# Patient Record
(demographics unavailable — no encounter records)

---

## 2017-08-27 NOTE — EMERGENCY DEPARTMENT REPORT
- General


Chief complaint: Skin/Abscess/Foreign Body


Stated complaint: PAINFUL KNOT ON BACK/CONSTANT ITCHING


Time Seen by Provider: 08/27/17 20:55


Source: patient


Mode of arrival: Ambulatory


Limitations: No Limitations





- History of Present Illness


Initial comments: 





This is a 27-year-old female nontoxic, well nourished in appearance, no acute 

signs of distress deficit ED complaining of open abscess to the  back region.  

Patient states she developed the symptoms last week and stated it was draining 

with purulent drainage and stated it is getting worse.  Patient denies any 

trauma.  Denies fever, chills, nausea, vomiting, chest pain, shortness of breath

, abdominal pain, numbness, tingling, stiff neck, or headaches.  Patient denies 

any allergies or past medical history.


MD complaint: abscess/boil


-: Gradual, week(s) (1)


Tetanus Up to Date: unsure


Location: back


Severity: moderate


Severity scale (0 -10): 8


Quality: burning, aching


Consistency: constant


Improves with: none


Worsens with: none


Context: none


Associated symptoms: denies other symptoms


Treatments Prior to Arrival: none





- Related Data


 Previous Rx's











 Medication  Instructions  Recorded  Last Taken  Type


 


Clindamycin [Clindamycin CAP] 450 mg PO Q8HR 7 Days 08/27/17 Unknown Rx


 


Ibuprofen [Motrin 600 MG tab] 600 mg PO Q8H PRN #30 tablet 08/27/17 Unknown Rx











 Allergies











Allergy/AdvReac Type Severity Reaction Status Date / Time


 


No Known Allergies Allergy   Unverified 08/27/17 19:29














Abscess Boil HPI





- HPI


Chief Complaint: Skin/Abscess/Foreign Body


Stated Complaint: PAINFUL KNOT ON BACK/CONSTANT ITCHING


Time Seen by Provider: 08/27/17 20:55


Home Medications: 


 Previous Rx's











 Medication  Instructions  Recorded  Last Taken  Type


 


Clindamycin [Clindamycin CAP] 450 mg PO Q8HR 7 Days 08/27/17 Unknown Rx


 


Ibuprofen [Motrin 600 MG tab] 600 mg PO Q8H PRN #30 tablet 08/27/17 Unknown Rx











Allergies/Adverse Reactions: 


 Allergies











Allergy/AdvReac Type Severity Reaction Status Date / Time


 


No Known Allergies Allergy   Unverified 08/27/17 19:29














ED Review of Systems


ROS: 


Stated complaint: PAINFUL KNOT ON BACK/CONSTANT ITCHING


Other details as noted in HPI





Constitutional: denies: chills, fever


Eyes: denies: eye pain, eye discharge, vision change


ENT: denies: ear pain, throat pain


Respiratory: denies: cough, shortness of breath, wheezing


Cardiovascular: denies: chest pain, palpitations


Endocrine: no symptoms reported


Gastrointestinal: denies: abdominal pain, nausea, diarrhea


Genitourinary: denies: urgency, dysuria, discharge


Musculoskeletal: denies: back pain, joint swelling, arthralgia


Skin: denies: rash, lesions


Neurological: denies: headache, weakness, paresthesias


Psychiatric: denies: anxiety, depression


Hematological/Lymphatic: denies: easy bleeding, easy bruising





ED Past Medical Hx





- Past Medical History


Previous Medical History?: No





- Surgical History


Past Surgical History?: No





- Social History


Smoking Status: Never Smoker


Substance Use Type: None





- Medications


Home Medications: 


 Home Medications











 Medication  Instructions  Recorded  Confirmed  Last Taken  Type


 


Clindamycin [Clindamycin CAP] 450 mg PO Q8HR 7 Days 08/27/17  Unknown Rx


 


Ibuprofen [Motrin 600 MG tab] 600 mg PO Q8H PRN #30 tablet 08/27/17  Unknown Rx














ED Physical Exam





- General


Limitations: No Limitations


General appearance: alert, in no apparent distress





- Head


Head exam: Present: atraumatic, normocephalic





- Eye


Eye exam: Present: normal appearance, PERRL, EOMI.  Absent: scleral icterus, 

conjunctival injection, nystagmus, periorbital swelling, periorbital tenderness


Pupils: Present: normal accommodation





- ENT


ENT exam: Present: normal exam, normal orophraynx, mucous membranes moist, TM's 

normal bilaterally, normal external ear exam





- Neck


Neck exam: Present: normal inspection, full ROM.  Absent: tenderness, 

meningismus, lymphadenopathy, thyromegaly





- Respiratory


Respiratory exam: Present: normal lung sounds bilaterally.  Absent: respiratory 

distress, wheezes, rales, rhonchi, stridor, chest wall tenderness, accessory 

muscle use, decreased breath sounds, prolonged expiratory





- Cardiovascular


Cardiovascular Exam: Present: regular rate, normal rhythm, normal heart sounds.

  Absent: bradycardia, tachycardia, irregular rhythm, systolic murmur, 

diastolic murmur, rubs, gallop





- GI/Abdominal


GI/Abdominal exam: Present: soft, normal bowel sounds.  Absent: distended, 

tenderness, guarding, rebound, rigid, diminished bowel sounds





- Rectal


Rectal exam: Present: deferred





- Extremities Exam


Extremities exam: Present: normal inspection, full ROM, normal capillary 

refill.  Absent: tenderness, pedal edema, joint swelling, calf tenderness





- Back Exam


Back exam: Present: normal inspection, full ROM, other (2 cm x 2 cm open 

abscess with purulent drainage.  No induration.  No fluctuance.  Tender to 

touch.  Warm to touch.  Erythema.).  Absent: tenderness, CVA tenderness (R), 

CVA tenderness (L), muscle spasm, paraspinal tenderness, vertebral tenderness, 

rash noted





- Neurological Exam


Neurological exam: Present: alert, oriented X3, CN II-XII intact, normal gait, 

reflexes normal





- Psychiatric


Psychiatric exam: Present: normal affect, normal mood





- Skin


Skin exam: Present: warm, dry, intact, normal color.  Absent: rash





ED Course


 Vital Signs











  08/27/17





  19:26


 


Temperature 99.3 F


 


Pulse Rate 64


 


Respiratory 18





Rate 


 


Blood Pressure 154/87


 


O2 Sat by Pulse 100





Oximetry 














- Reevaluation(s)


Reevaluation #1: 





08/27/17 22:02


Patient speaking in full sentences but no signs of distress.





ED Medical Decision Making





- Medical Decision Making





This is a 27-year-old female that presents with cellulitis and open abscess 

that is draining purulent.  Patient was examined myself.  Patient is stable.  

Patient be treated with clindamycin at discharge.  A permanent marker has been 

used to outline the redness.  Patient was instructed to follow-up with a 

primary Up in 3-5 days or symptoms such as increased swelling, increased 

redness past permanent marker, or worsening symptoms return to emergency room 

as soon as possible. At time time of discharge, the patient does not seem toxic 

or ill in appearance.  No acute signs of distress noted.  Patient agrees to 

discharge treatment plan of care.  No further questions noted by the patient.


Critical care attestation.: 


If time is entered above; I have spent that time in minutes in the direct care 

of this critically ill patient, excluding procedure time.








ED Disposition


Clinical Impression: 


Cellulitis


Qualifiers:


 Site of cellulitis: other site Qualified Code(s): L03.818 - Cellulitis of 

other sites





Disposition: DC-01 TO HOME OR SELFCARE


Is pt being admited?: No


Does the pt Need Aspirin: No


Condition: Stable


Instructions:  Clindamycin (By mouth), Ibuprofen (By mouth), Cellulitis (ED)


Additional Instructions: 


Follow-up with a primary Up in 3-5 days or symptoms such as increased swelling, 

increased redness past permanent marker, or worsening symptoms return to 

emergency room as soon as possible. 


Take the full course of antibiotic that was prescribed.


Take ibuprofen as prescribed as needed for pain.


Prescriptions: 


Clindamycin [Clindamycin CAP] 450 mg PO Q8HR 7 Days


Ibuprofen [Motrin 600 MG tab] 600 mg PO Q8H PRN #30 tablet


 PRN Reason: Pain


Referrals: 


PRIMARY CARE,MD [Primary Care Provider] - 3-5 Days


CAROLINA GEIGER MD [Staff Physician] - 3-5 Days


[facility] [Outside] - 3-5 Days


[facility] [Outside] - 3-5 Days


Forms:  Work/School Release Form(ED)

## 2018-01-13 NOTE — EMERGENCY DEPARTMENT REPORT
Minor Respiratory (Peds)





- HPI


Chief Complaint: Sore Throat


Stated Complaint: SORE THROAT


Time Seen by Provider: 01/13/18 20:02


Duration: 2 Days


Pain Location: Throat


Pain Severity: Moderate


Symptoms: Yes Fever, Yes Sore Throat, Yes Sick Contacts, Yes Able to Tolerate 

Fluids, Yes Good Urine Output, Yes Active and Alert, No Rhinorrhea, No Ear Pain

, No Cough, No Shortness of Breath





ED Review of Systems


ROS: 


Stated complaint: TONGUE AND THROAT SWOLLEN


Other details as noted in HPI





Comment: All other systems reviewed and negative


ENT: throat pain.  denies: ear pain, dental pain, hearing loss, epistaxis, 

congestion





Pediatric Past Medical History





- Chronic Health Problems


Hx Asthma: No


Hx Diabetes: No


Hx HIV: No


Hx Renal Disease: No


Hx Sickle Cell Disease: No


Hx Seizures: No





Peds Minor Resp. exam





- Exam


General: 


Vital signs noted. No distress. Alert and acting appropriately.





Peds HEENT: Pharyngeal Erythema: Yes, Pharyngeal Exudates: No, Moist Mucous 

Membranes: Yes, Rhinorrhea: No, Conjuctival Injection: No


Ear: Neither TM Bulge, Neither TM Erythema, Neither EAC Discharge


Peds neck exam: Adenopathy: No, Supple: Yes


Peds Lung exam: Good Air Exchange: Yes, Wheezes: No, Stridor: No, Cough: No, 

Nasal Flaring: No, Retractions: No, Use of Accessory Muscles: No


Heart: Yes Regular, No Murmur


Peds abdomen: Abdominal Tenderness: No, Peritoneal Signs: No, Normal Bowel 

Sounds: Yes, Distention: No


Peds Skin Exam: Rash: No, Eczema: No


Neurologic: 


Alert and oriented, no deficits.








Musculoskeletal: 


Unremarkable.











ED Course


 Vital Signs











  01/13/18





  17:36


 


Temperature 100.3 F H


 


Pulse Rate 110 H


 


Respiratory 18





Rate 


 


Blood Pressure 138/96


 


O2 Sat by Pulse 100





Oximetry 














- Reevaluation(s)


Reevaluation #1: 





01/13/18 21:36


Patient to the emergency room today with chief complaint of shortness of breath 

or fever.  She states that she has been like this for 2 days.  She also brings 

with her 10-year-old child sustained.  Patient has been taking over-the-counter 

Motrin and Tylenol.  Patient states it is difficult for her to talk because of 

emesis.  Erythema of the posterior nasopharynx.  No abscess.  Fever noted.  

Child same symptomology.  No drooling.  Patient taking by mouth controlling 

secretions.





No significant past medical or surgical history.  No medications at home.  No 

known drug allergies.





Medicated here in the emergency room with Decadron.


Medicated for fever w motrin. antibiotics started








Reevaluation #2: 





01/13/18 21:37


On reassessment patient is much improved fevers trending down.  Heart rate is 

trending down.  Patient is able to speak much more clear.  She states she feels 

better.





Patient will be discharged to home with family.  She has been given discharge 

instructions return instructions and follow-up instructions.





ED Medical Decision Making





- Medical Decision Making





[facility] INTACT


HERE W CHILD W SAME


SORE THROAT AND FEVER








- Differential Diagnosis


PHARYNGITIS


Critical care attestation.: 


If time is entered above; I have spent that time in minutes in the direct care 

of this critically ill patient, excluding procedure time.








ED Disposition


Clinical Impression: 


 Fever, Pharyngitis, Strep pharyngitis





Disposition: DC-01 TO HOME OR SELFCARE


Is pt being admited?: No


Does the pt Need Aspirin: No


Condition: Stable


Instructions:  Pharyngitis (ED), Fever in Adults (ED)


Additional Instructions: 


REST





HYDRATE WELL WITH LIQUIDS





MED AS ORDERED TODAY UNTIL GONE





GOOD HAND WASHING





ADVANCE DIET AS TOLERATED





IF YOUR KIDS DEVELOP SYMPTOMS THEY SHOULD BE SEEN BY PEDIATRICIAN





RETURN TO ER IF THROAT SWELLS.





FOLLOW UP WITH PCP ON MONDAY


SEE BELOW








Prescriptions: 


Amoxicillin/Potassium Clav [Augmentin 875-125 Tablet] 1 each PO BID #20 tablet


predniSONE [Deltasone] 20 mg PO DAILY #5 tablet


Referrals: 


PRIMARY CARE,MD [Referring] - 3-5 Days


ESME CAIN MD [Staff Physician] - 3-5 Days


Forms:  Work/School Release Form(ED)


Time of Disposition: 21:29